# Patient Record
Sex: FEMALE | Race: BLACK OR AFRICAN AMERICAN | NOT HISPANIC OR LATINO
[De-identification: names, ages, dates, MRNs, and addresses within clinical notes are randomized per-mention and may not be internally consistent; named-entity substitution may affect disease eponyms.]

---

## 2018-03-01 PROBLEM — Z00.00 ENCOUNTER FOR PREVENTIVE HEALTH EXAMINATION: Status: ACTIVE | Noted: 2018-03-01

## 2018-03-12 ENCOUNTER — APPOINTMENT (OUTPATIENT)
Dept: NEPHROLOGY | Facility: CLINIC | Age: 68
End: 2018-03-12
Payer: MEDICARE

## 2018-03-12 VITALS — RESPIRATION RATE: 12 BRPM | SYSTOLIC BLOOD PRESSURE: 136 MMHG | DIASTOLIC BLOOD PRESSURE: 75 MMHG | HEART RATE: 85 BPM

## 2018-03-12 VITALS — WEIGHT: 216 LBS

## 2018-03-12 PROCEDURE — 99204 OFFICE O/P NEW MOD 45 MIN: CPT

## 2018-03-12 RX ORDER — GABAPENTIN 300 MG/1
300 CAPSULE ORAL 3 TIMES DAILY
Qty: 120 | Refills: 0 | Status: ACTIVE | COMMUNITY
Start: 2018-03-12

## 2018-03-12 RX ORDER — CARBAMAZEPINE 200 MG/1
200 TABLET ORAL 3 TIMES DAILY
Qty: 120 | Refills: 0 | Status: ACTIVE | COMMUNITY
Start: 2018-03-12

## 2018-03-15 ENCOUNTER — APPOINTMENT (OUTPATIENT)
Dept: VASCULAR SURGERY | Facility: CLINIC | Age: 68
End: 2018-03-15
Payer: MEDICARE

## 2018-03-15 VITALS — OXYGEN SATURATION: 100 % | HEART RATE: 57 BPM | DIASTOLIC BLOOD PRESSURE: 77 MMHG | SYSTOLIC BLOOD PRESSURE: 152 MMHG

## 2018-03-15 PROCEDURE — 93970 EXTREMITY STUDY: CPT

## 2018-03-15 PROCEDURE — 99204 OFFICE O/P NEW MOD 45 MIN: CPT

## 2018-03-16 LAB
ALBUMIN SERPL ELPH-MCNC: 3.9 G/DL
ALBUMIN SERPL ELPH-MCNC: 3.9 G/DL
ALP BLD-CCNC: 76 U/L
ALT SERPL-CCNC: 10 U/L
ANION GAP SERPL CALC-SCNC: 9 MMOL/L
APPEARANCE: CLEAR
AST SERPL-CCNC: 15 U/L
BACTERIA: NEGATIVE
BILIRUB DIRECT SERPL-MCNC: 0.1 MG/DL
BILIRUB INDIRECT SERPL-MCNC: 0.2 MG/DL
BILIRUB SERPL-MCNC: 0.3 MG/DL
BILIRUBIN URINE: NEGATIVE
BLOOD URINE: NEGATIVE
BUN SERPL-MCNC: 19 MG/DL
CALCIUM SERPL-MCNC: 9.4 MG/DL
CHLORIDE SERPL-SCNC: 88 MMOL/L
CO2 SERPL-SCNC: 28 MMOL/L
COLOR: YELLOW
CREAT SERPL-MCNC: 0.88 MG/DL
CREAT SPEC-SCNC: 56 MG/DL
CREAT SPEC-SCNC: 56 MG/DL
CREAT/PROT UR: 0.2 RATIO
GLUCOSE QUALITATIVE U: NEGATIVE MG/DL
GLUCOSE SERPL-MCNC: 94 MG/DL
KETONES URINE: NEGATIVE
LEUKOCYTE ESTERASE URINE: NEGATIVE
MICROALBUMIN 24H UR DL<=1MG/L-MCNC: 1.4 MG/DL
MICROALBUMIN/CREAT 24H UR-RTO: 25 MG/G
MICROSCOPIC-UA: NORMAL
NITRITE URINE: NEGATIVE
PH URINE: 7
PHOSPHATE SERPL-MCNC: 3.1 MG/DL
POTASSIUM SERPL-SCNC: 4.6 MMOL/L
PROT SERPL-MCNC: 7.8 G/DL
PROT UR-MCNC: 9 MG/DL
PROTEIN URINE: NEGATIVE MG/DL
RED BLOOD CELLS URINE: 1 /HPF
SODIUM SERPL-SCNC: 125 MMOL/L
SPECIFIC GRAVITY URINE: 1.01
SQUAMOUS EPITHELIAL CELLS: 0 /HPF
URATE SERPL-MCNC: 3 MG/DL
UROBILINOGEN URINE: NEGATIVE MG/DL
WHITE BLOOD CELLS URINE: 2 /HPF

## 2018-03-16 RX ORDER — CHOLECALCIFEROL (VITAMIN D3) 125 MCG
TABLET ORAL
Refills: 0 | Status: ACTIVE | COMMUNITY

## 2018-03-29 ENCOUNTER — OUTPATIENT (OUTPATIENT)
Dept: OUTPATIENT SERVICES | Facility: HOSPITAL | Age: 68
LOS: 1 days | End: 2018-03-29
Payer: MEDICARE

## 2018-03-29 ENCOUNTER — APPOINTMENT (OUTPATIENT)
Dept: CT IMAGING | Facility: HOSPITAL | Age: 68
End: 2018-03-29
Payer: MEDICARE

## 2018-03-29 PROCEDURE — 71250 CT THORAX DX C-: CPT | Mod: 26

## 2018-03-29 PROCEDURE — 71250 CT THORAX DX C-: CPT

## 2018-04-10 ENCOUNTER — APPOINTMENT (OUTPATIENT)
Dept: NEPHROLOGY | Facility: CLINIC | Age: 68
End: 2018-04-10
Payer: MEDICARE

## 2018-04-10 VITALS — SYSTOLIC BLOOD PRESSURE: 137 MMHG | DIASTOLIC BLOOD PRESSURE: 80 MMHG | RESPIRATION RATE: 12 BRPM | HEART RATE: 60 BPM

## 2018-04-10 PROCEDURE — 99214 OFFICE O/P EST MOD 30 MIN: CPT

## 2018-04-10 RX ORDER — HYDROCHLOROTHIAZIDE 25 MG/1
25 TABLET ORAL DAILY
Qty: 30 | Refills: 1 | Status: DISCONTINUED | COMMUNITY
Start: 2018-03-12 | End: 2018-04-10

## 2018-05-15 ENCOUNTER — APPOINTMENT (OUTPATIENT)
Dept: NEPHROLOGY | Facility: CLINIC | Age: 68
End: 2018-05-15
Payer: MEDICARE

## 2018-05-15 VITALS — DIASTOLIC BLOOD PRESSURE: 58 MMHG | HEART RATE: 74 BPM | RESPIRATION RATE: 14 BRPM | SYSTOLIC BLOOD PRESSURE: 124 MMHG

## 2018-05-15 PROCEDURE — 99214 OFFICE O/P EST MOD 30 MIN: CPT

## 2018-05-17 LAB
ANION GAP SERPL CALC-SCNC: 14 MMOL/L
BASOPHILS # BLD AUTO: 0.02 K/UL
BASOPHILS NFR BLD AUTO: 0.3 %
BUN SERPL-MCNC: 13 MG/DL
CALCIUM SERPL-MCNC: 9.7 MG/DL
CHLORIDE SERPL-SCNC: 92 MMOL/L
CO2 SERPL-SCNC: 24 MMOL/L
CREAT SERPL-MCNC: 0.83 MG/DL
EOSINOPHIL # BLD AUTO: 0.11 K/UL
EOSINOPHIL NFR BLD AUTO: 1.8 %
FERRITIN SERPL-MCNC: 54 NG/ML
GLUCOSE SERPL-MCNC: 93 MG/DL
HCT VFR BLD CALC: 32 %
HGB BLD-MCNC: 10.8 G/DL
IMM GRANULOCYTES NFR BLD AUTO: 0.3 %
IRON SATN MFR SERPL: 22 %
IRON SERPL-MCNC: 63 UG/DL
LYMPHOCYTES # BLD AUTO: 1.71 K/UL
LYMPHOCYTES NFR BLD AUTO: 27.5 %
MAN DIFF?: NORMAL
MCHC RBC-ENTMCNC: 28.9 PG
MCHC RBC-ENTMCNC: 33.8 GM/DL
MCV RBC AUTO: 85.6 FL
MONOCYTES # BLD AUTO: 0.99 K/UL
MONOCYTES NFR BLD AUTO: 15.9 %
NEUTROPHILS # BLD AUTO: 3.36 K/UL
NEUTROPHILS NFR BLD AUTO: 54.2 %
OSMOLALITY SERPL: 274 MOS/KG
OSMOLALITY UR: 539 MOS/KG
PLATELET # BLD AUTO: 246 K/UL
POTASSIUM SERPL-SCNC: 4.7 MMOL/L
RBC # BLD: 3.74 M/UL
RBC # FLD: 14.1 %
SODIUM ?TM SUB UR QN: 100 MMOL/L
SODIUM SERPL-SCNC: 130 MMOL/L
TIBC SERPL-MCNC: 281 UG/DL
UIBC SERPL-MCNC: 218 UG/DL
URATE SERPL-MCNC: 2.8 MG/DL
URATE UR-MCNC: 36.3 MG/DL
WBC # FLD AUTO: 6.21 K/UL

## 2018-05-29 ENCOUNTER — TRANSCRIPTION ENCOUNTER (OUTPATIENT)
Age: 68
End: 2018-05-29

## 2018-08-21 ENCOUNTER — APPOINTMENT (OUTPATIENT)
Dept: OTOLARYNGOLOGY | Facility: CLINIC | Age: 68
End: 2018-08-21
Payer: MEDICARE

## 2018-08-21 VITALS
HEIGHT: 69 IN | HEART RATE: 74 BPM | WEIGHT: 215 LBS | DIASTOLIC BLOOD PRESSURE: 80 MMHG | BODY MASS INDEX: 31.84 KG/M2 | SYSTOLIC BLOOD PRESSURE: 160 MMHG

## 2018-08-21 DIAGNOSIS — E04.9 NONTOXIC GOITER, UNSPECIFIED: ICD-10-CM

## 2018-08-21 DIAGNOSIS — Z86.79 PERSONAL HISTORY OF OTHER DISEASES OF THE CIRCULATORY SYSTEM: ICD-10-CM

## 2018-08-21 DIAGNOSIS — Z82.49 FAMILY HISTORY OF ISCHEMIC HEART DISEASE AND OTHER DISEASES OF THE CIRCULATORY SYSTEM: ICD-10-CM

## 2018-08-21 DIAGNOSIS — J34.2 DEVIATED NASAL SEPTUM: ICD-10-CM

## 2018-08-21 PROCEDURE — 31575 DIAGNOSTIC LARYNGOSCOPY: CPT

## 2018-08-21 PROCEDURE — 99204 OFFICE O/P NEW MOD 45 MIN: CPT | Mod: 25

## 2018-08-21 RX ORDER — LORATADINE 5 MG/5 ML
10 SOLUTION, ORAL ORAL
Qty: 30 | Refills: 2 | Status: ACTIVE | COMMUNITY
Start: 2018-08-21 | End: 1900-01-01

## 2018-08-21 RX ORDER — FLUTICASONE PROPIONATE 50 UG/1
50 SPRAY, METERED NASAL DAILY
Qty: 1 | Refills: 5 | Status: ACTIVE | COMMUNITY
Start: 2018-08-21 | End: 1900-01-01

## 2018-08-23 PROBLEM — Z82.49 FAMILY HISTORY OF HYPERTENSION: Status: ACTIVE | Noted: 2018-08-21

## 2018-08-23 PROBLEM — Z86.79 HISTORY OF HYPERTENSION: Status: RESOLVED | Noted: 2018-08-21 | Resolved: 2018-08-23

## 2018-08-23 PROBLEM — J34.2 DEVIATED NASAL SEPTUM: Status: ACTIVE | Noted: 2018-08-23

## 2018-08-23 PROBLEM — E04.9 GOITER: Status: ACTIVE | Noted: 2018-08-23

## 2018-08-23 RX ORDER — ATORVASTATIN CALCIUM 10 MG/1
10 TABLET, FILM COATED ORAL
Refills: 0 | Status: ACTIVE | COMMUNITY

## 2018-08-23 RX ORDER — TIMOLOL MALEATE 10 MG/1
10 TABLET ORAL
Refills: 0 | Status: ACTIVE | COMMUNITY

## 2018-10-23 ENCOUNTER — APPOINTMENT (OUTPATIENT)
Dept: OTOLARYNGOLOGY | Facility: CLINIC | Age: 68
End: 2018-10-23

## 2019-02-22 ENCOUNTER — APPOINTMENT (OUTPATIENT)
Dept: OTOLARYNGOLOGY | Facility: CLINIC | Age: 69
End: 2019-02-22
Payer: MEDICARE

## 2019-02-22 VITALS
DIASTOLIC BLOOD PRESSURE: 82 MMHG | HEART RATE: 61 BPM | BODY MASS INDEX: 31.1 KG/M2 | HEIGHT: 69 IN | WEIGHT: 210 LBS | SYSTOLIC BLOOD PRESSURE: 159 MMHG

## 2019-02-22 PROCEDURE — 99214 OFFICE O/P EST MOD 30 MIN: CPT

## 2019-02-22 NOTE — PROCEDURE
[FreeTextEntry3] : Anterior Rhinoscopy:\par leftward septal deviation w sharp spur\par mild sinonasal mucosal edema and erythema with clear mucus\par \par Fiberoptic Laryngoscopy (from prior visit):\par upper airway widely patent\par moderately severe boggy postcricoid edema\par TVF symmetric and mobile\par no masses/lesions

## 2019-02-22 NOTE — DATA REVIEWED
[de-identified] : CT chest without contrast 3/2018:\par FINDINGS: \par The heart is normal in size. Coronary artery calcifications. Probable trace \par aortic valve calcifications. No pericardial effusion is seen. \par \par Evaluation of the vasculature is limited without intravenous contrast, but \par there is no evidence of thoracic aortic aneurysm. \par \par Evaluation for adenopathy is limited without intravenous contrast. Within \par that limitation, no noncalcified mediastinal or hilar lymphadenopathy is \par seen. Calcified left hilar lymph node. Partially imaged thyroid gland \par showing diffuse enlargement of both lobes and isthmus. Small calcified \par nodules in the right thyroid lobe. Mild dilatation of the esophagus \par containing air/fluid level of uncertain etiology. \par \par No pleural effusions are seen. Evaluation of the pulmonary parenchyma \par demonstrates a 4 mm right middle lobe pulmonary nodule abutting the minor \par fissure on image 151 series 4. There is linear atelectasis in the left lower \par lobe. Mild bilateral cylindrical bronchiectasis especially the lower lobes, \par lingula and right middle lobe. Calcified nodule in the right lower lobe and \par left upper lobe compatible with old granulomatous disease. \par \par Limited evaluation of the upper abdomen demonstrates no significant \par abnormality. \par \par Evaluation of the osseous structures demonstrates severe degenerative \par changes. Thoracolumbar scoliosis. \par \par IMPRESSION: \par 4 mm right middle lobe pulmonary nodule. Patient is at high risk for \par malignancy/smoker, a follow-up CT of the chest in 12 months may be obtained. \par \par Evidence of old granulomatous disease. \par \par Mild dilatation of the esophagus with air-fluid levels. In etiology.

## 2019-02-22 NOTE — PHYSICAL EXAM
[Midline] : trachea located in midline position [Normal] : no rashes [FreeTextEntry1] : voice strong [de-identified] : flat and soft, no masses/lesions

## 2019-02-22 NOTE — ASSESSMENT
[FreeTextEntry1] : 68F here in followup. Since last seen 6 months prior, she is doing well. She took the PPIs daily for one month and has continued using the nasal steroid sprays since last visit. Her coughing/throat clearing had completely stopped until several weeks ago when it restarted, now just mild and intermittent, and only at night. She is improved and content. She did not get the thyroid FNA as recommended last visit.\par Initially seen for persistent dry, hacking cough for the past year. The cough is accompanied with frequent throat clearing and postnasal drip. Coughing is intermittent without any known triggers, unaffected by time, eating or body position. There is no difficulty eating, breathing, swallowing or coughing. There is no voice change. The coughing does not wake her up at night. She takes daily allegra for seasonal allergies. She has frequent late night eating, has coffee, tea and chocolate. Noncontrast CT chest 3/2018 shows a partially imaged thyroid gland with diffuse enlargement and a calcified right nodule, but no tracheal compression. On exam, voice is strong and neck is flat. Rhinoscopy shows leftward septal deviation and diffuse sinonasal mucosal edema and erythema with clear mucus. The rest of the complete and comprehensive head and neck exam is otherwise unremarkable.\par Her sx were resolved on reflux medications with diet/lifestyle changes and nasal steroid sprays. Will restart daily PPI for 1 month, then start to taper over an additional two month period. She should continue the flonase and allegra for rhinitis. Will also proceed with US-guided FNA of calcified right thyroid nodules. RTO after FNA. All questions answered.

## 2019-02-22 NOTE — HISTORY OF PRESENT ILLNESS
[de-identified] : 68F here in followup.\par \par Since last seen 6 months prior, she is doing well. She took the PPIs daily for one month and has continued using the nasal steroid sprays since last visit. Her coughing had completely stopped until several weeks ago when it restarted, now just mild and intermittent, and only at night. She is improved and content.\par She did not get the thyroid FNA as recommended last visit.\par \par Initially seen for persistent dry, hacking cough. Coughing is intermittent without any known triggers, unaffected by time, eating or body position. There is no difficulty eating, breathing, swallowing or coughing. There is no voice change. It does not wake her up at night.\par The cough is accompanied with frequent throat clearing and postnasal drip.  \par She takes daily allegra for seasonal allergies.\par Frequent late night eating, has coffee, tea and chocolate.\par \par No tobacco/etoh\par \par CT chest 3/2018 shows partially imaged thyroid gland with diffuse enlargement w calcified right nodule, but no tracheal compression.\par \par ROS otherwise unremarkable.

## 2019-03-28 ENCOUNTER — APPOINTMENT (OUTPATIENT)
Dept: ULTRASOUND IMAGING | Facility: HOSPITAL | Age: 69
End: 2019-03-28
Payer: MEDICARE

## 2019-03-28 ENCOUNTER — OUTPATIENT (OUTPATIENT)
Dept: OUTPATIENT SERVICES | Facility: HOSPITAL | Age: 69
LOS: 1 days | End: 2019-03-28
Payer: MEDICARE

## 2019-03-28 ENCOUNTER — RESULT REVIEW (OUTPATIENT)
Age: 69
End: 2019-03-28

## 2019-03-28 PROCEDURE — 10005 FNA BX W/US GDN 1ST LES: CPT

## 2019-03-28 PROCEDURE — 88173 CYTOPATH EVAL FNA REPORT: CPT

## 2019-03-28 PROCEDURE — 88305 TISSUE EXAM BY PATHOLOGIST: CPT

## 2019-03-29 LAB — NON-GYNECOLOGICAL CYTOLOGY STUDY: SIGNIFICANT CHANGE UP

## 2019-04-01 ENCOUNTER — TRANSCRIPTION ENCOUNTER (OUTPATIENT)
Age: 69
End: 2019-04-01

## 2019-04-05 ENCOUNTER — APPOINTMENT (OUTPATIENT)
Dept: NEPHROLOGY | Facility: CLINIC | Age: 69
End: 2019-04-05
Payer: MEDICARE

## 2019-04-05 VITALS — DIASTOLIC BLOOD PRESSURE: 77 MMHG | HEART RATE: 65 BPM | SYSTOLIC BLOOD PRESSURE: 144 MMHG | RESPIRATION RATE: 16 BRPM

## 2019-04-05 PROCEDURE — 99215 OFFICE O/P EST HI 40 MIN: CPT

## 2019-04-05 RX ORDER — FOLIC ACID 20 MG
CAPSULE ORAL
Refills: 0 | Status: DISCONTINUED | COMMUNITY
End: 2019-04-05

## 2019-04-05 RX ORDER — AMLODIPINE BESYLATE AND BENAZEPRIL HYDROCHLORIDE 10; 40 MG/1; MG/1
10-40 CAPSULE ORAL
Refills: 0 | Status: DISCONTINUED | COMMUNITY
End: 2019-04-05

## 2019-04-05 RX ORDER — LOSARTAN POTASSIUM 100 MG/1
100 TABLET, FILM COATED ORAL DAILY
Qty: 90 | Refills: 3 | Status: ACTIVE | COMMUNITY
Start: 2019-04-05

## 2019-04-05 RX ORDER — VALSARTAN 320 MG/1
320 TABLET, COATED ORAL
Qty: 30 | Refills: 0 | Status: DISCONTINUED | COMMUNITY
Start: 2018-03-12 | End: 2019-04-05

## 2019-04-11 NOTE — HISTORY OF PRESENT ILLNESS
[FreeTextEntry1] : 67yo obese black F with chronic hyponatremia 2/2 carbamazepine and/or HCTZ, chronic HTN and LE edema here for f/u:\par \par Feeling well, no major health issues since last visit. She did trip over sipped and hit her shoulder, was in a lot of pain but this has resolved. Also with recurrent carpal tunnel. \par She went back on the HCTZ in January because of her LE edema. \par \par No dizziness/nausea. \par Edema persists, wearing compression stockings which helps. \par \par Checks BP at home, says it's always low at home and high in doctor's offices. 130/80s at home. \par \par Cardiologist Dr. Panfilo Forrester\par PCP Dr. Jamila Finney\par \par All other ROS negative

## 2019-04-11 NOTE — ASSESSMENT
[FreeTextEntry1] : 66yo F with chronic hyponatremia ?2/2 HCTZ or carbamazapine, chronic HTN and LE edema here for f/u:\par needs update\par -added torsemide instead of hctz\par - echo, cmp, BNP, serum and urine Osm and uric acids, urine Na, TSH. If torsemide doesn't help w edema on next visit, and echo wnl, will d/c amlodipine and try carvedilol. Cont Losartan. \par -element of white coat HTN - to bring home BP monitor (wrist) to office next visit so we can compare it to ours.\par  \par # Chronic asymp. 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 If Na drops again can start NaCl tabs 1g BID however this will likely exacerbate her edema.\par \par # KAMARI - resolved, Cr 0.8\par \par # LE edema\par - cardiac w/u per pcp\par - d/w pt and pcp that could certainly be from amlodipine, if cardiac w/u negative suggest decreasing amlodipine to 5mg to see if improves or switching all together to different agent\par \par # HTN\par - well controlled of HCTZ, on Valsartan and amlodipine 10mg. Low na diet and weight loss/exercise discussed w pt

## 2019-04-11 NOTE — DISCUSSION/SUMMARY
[FreeTextEntry1] : 66yo F with chronic hyponatremia ?2/2 HCTZ or carbamazapine, chronic HTN and LE edema here for f/u:\par \par call Dr ravi when labs in, d/c amlo plan in exchange for lasix and coreg\par \par 2012 134, 2013 129, 2014 128, 2016 136, 2017 Oct 125. Low Cl. No w/u for Na that he can see in the past, presumed to be 2/2 carbamazepine +/- HCTZ, will need to be stopped\par Advised echo for eval of LE swelling as no renal etiology and duplex LE negative as well by vascular. He will switch her from amlodipine which may also be contributing.

## 2019-04-11 NOTE — PHYSICAL EXAM
[General Appearance - Alert] : alert [General Appearance - In No Acute Distress] : in no acute distress [Sclera] : the sclera and conjunctiva were normal [PERRL With Normal Accommodation] : pupils were equal in size, round, and reactive to light [Extraocular Movements] : extraocular movements were intact [Outer Ear] : the ears and nose were normal in appearance [Oropharynx] : the oropharynx was normal [Neck Appearance] : the appearance of the neck was normal [Neck Cervical Mass (___cm)] : no neck mass was observed [Jugular Venous Distention Increased] : there was no jugular-venous distention [Thyroid Diffuse Enlargement] : the thyroid was not enlarged [Thyroid Nodule] : there were no palpable thyroid nodules [Auscultation Breath Sounds / Voice Sounds] : lungs were clear to auscultation bilaterally [Heart Rate And Rhythm] : heart rate was normal and rhythm regular [Heart Sounds] : normal S1 and S2 [Heart Sounds Gallop] : no gallops [Murmurs] : no murmurs [Heart Sounds Pericardial Friction Rub] : no pericardial rub [Bowel Sounds] : normal bowel sounds [Abdomen Soft] : soft [Abdomen Tenderness] : non-tender [Abdomen Mass (___ Cm)] : no abdominal mass palpated [Abnormal Walk] : normal gait [Musculoskeletal - Swelling] : no joint swelling seen [Skin Color & Pigmentation] : normal skin color and pigmentation [Skin Turgor] : normal skin turgor [] : no rash [Cranial Nerves] : cranial nerves 2-12 were intact [No Focal Deficits] : no focal deficits [Oriented To Time, Place, And Person] : oriented to person, place, and time [Impaired Insight] : insight and judgment were intact [Affect] : the affect was normal [FreeTextEntry1] : + edema b/l

## 2019-07-09 ENCOUNTER — APPOINTMENT (OUTPATIENT)
Dept: NEPHROLOGY | Facility: CLINIC | Age: 69
End: 2019-07-09
Payer: MEDICARE

## 2019-07-09 VITALS — RESPIRATION RATE: 16 BRPM | SYSTOLIC BLOOD PRESSURE: 140 MMHG | HEART RATE: 68 BPM | DIASTOLIC BLOOD PRESSURE: 72 MMHG

## 2019-07-09 DIAGNOSIS — R05 COUGH: ICD-10-CM

## 2019-07-09 DIAGNOSIS — N17.9 ACUTE KIDNEY FAILURE, UNSPECIFIED: ICD-10-CM

## 2019-07-09 PROCEDURE — 99215 OFFICE O/P EST HI 40 MIN: CPT

## 2019-07-09 RX ORDER — OMEPRAZOLE 40 MG/1
40 CAPSULE, DELAYED RELEASE ORAL
Qty: 30 | Refills: 3 | Status: DISCONTINUED | COMMUNITY
Start: 2018-08-21 | End: 2019-07-09

## 2019-07-09 RX ORDER — ESOMEPRAZOLE MAGNESIUM 40 MG/1
40 CAPSULE, DELAYED RELEASE ORAL DAILY
Qty: 30 | Refills: 5 | Status: DISCONTINUED | COMMUNITY
Start: 2019-02-22 | End: 2019-07-09

## 2019-07-15 PROBLEM — N17.9 AKI (ACUTE KIDNEY INJURY): Status: ACTIVE | Noted: 2018-03-12

## 2019-07-15 PROBLEM — R05 CHRONIC COUGHING: Status: ACTIVE | Noted: 2018-08-23

## 2019-07-15 NOTE — PHYSICAL EXAM
[General Appearance - Alert] : alert [General Appearance - In No Acute Distress] : in no acute distress [Sclera] : the sclera and conjunctiva were normal [PERRL With Normal Accommodation] : pupils were equal in size, round, and reactive to light [Extraocular Movements] : extraocular movements were intact [Outer Ear] : the ears and nose were normal in appearance [Oropharynx] : the oropharynx was normal [Neck Appearance] : the appearance of the neck was normal [Neck Cervical Mass (___cm)] : no neck mass was observed [Jugular Venous Distention Increased] : there was no jugular-venous distention [Thyroid Diffuse Enlargement] : the thyroid was not enlarged [Thyroid Nodule] : there were no palpable thyroid nodules [Auscultation Breath Sounds / Voice Sounds] : lungs were clear to auscultation bilaterally [Heart Sounds] : normal S1 and S2 [Heart Rate And Rhythm] : heart rate was normal and rhythm regular [Heart Sounds Gallop] : no gallops [Heart Sounds Pericardial Friction Rub] : no pericardial rub [Murmurs] : no murmurs [Bowel Sounds] : normal bowel sounds [Abdomen Soft] : soft [Abdomen Tenderness] : non-tender [Abnormal Walk] : normal gait [Musculoskeletal - Swelling] : no joint swelling seen [Abdomen Mass (___ Cm)] : no abdominal mass palpated [Skin Turgor] : normal skin turgor [Skin Color & Pigmentation] : normal skin color and pigmentation [] : no rash [Cranial Nerves] : cranial nerves 2-12 were intact [No Focal Deficits] : no focal deficits [Oriented To Time, Place, And Person] : oriented to person, place, and time [Impaired Insight] : insight and judgment were intact [Affect] : the affect was normal [FreeTextEntry1] : + edema b/l

## 2019-07-15 NOTE — ASSESSMENT
[FreeTextEntry1] : 66yo F with chronic hyponatremia ?2/2 HCTZ or carbamazapine, chronic HTN and LE edema here for f/u:\par \par -added torsemide instead of hctz\par - echo, cmp, BNP, serum and urine Osm and uric acids, urine Na, TSH. If torsemide doesn't help w edema on next visit, and echo wnl, will d/c amlodipine and try carvedilol. Cont Losartan. \par -element of white coat HTN - to bring home BP monitor (wrist) to office next visit so we can compare it to ours.\par  \par # Chronic asymp. 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 If Na drops again can start NaCl tabs 1g BID however this will likely exacerbate her edema. She forgot to do April labs that we'd ordered - to do today\par \par # KAMARI - resolved, Cr 0.8 - recheck\par \par # LE edema - likely venous insufficiency\par - still recommend reducing amlodipine to 5mg \par \par # HTN\par - fair control\par \par # coughing - d/w pt that could be 2/2 GERD, to f/u with PCP

## 2019-07-15 NOTE — HISTORY OF PRESENT ILLNESS
[FreeTextEntry1] : 67yo obese black F with chronic hyponatremia 2/2 carbamazepine and/or HCTZ, chronic HTN and LE edema here for f/u:\par \par Urinating frequently since torsemide started in April. No nocturia. No orthostatic sx.  No excessive thirsty. Hasn't been checking BP at home. Has noted improvement in LE especially when she uses compression stockings. \par \par Cardiologist Dr. Panfilo Forrester\par PCP Dr. Jamila Finney\par \par \par OTC Baby ASA 81mg, no herbals/ supplements\par \par \par All other ROS negative

## 2019-08-06 ENCOUNTER — FORM ENCOUNTER (OUTPATIENT)
Age: 69
End: 2019-08-06

## 2019-08-07 ENCOUNTER — OUTPATIENT (OUTPATIENT)
Dept: OUTPATIENT SERVICES | Facility: HOSPITAL | Age: 69
LOS: 1 days | End: 2019-08-07
Payer: MEDICARE

## 2019-08-07 DIAGNOSIS — E87.1 HYPO-OSMOLALITY AND HYPONATREMIA: ICD-10-CM

## 2019-08-07 PROCEDURE — 93306 TTE W/DOPPLER COMPLETE: CPT | Mod: 26

## 2019-08-07 PROCEDURE — 93306 TTE W/DOPPLER COMPLETE: CPT

## 2019-08-09 ENCOUNTER — RX RENEWAL (OUTPATIENT)
Age: 69
End: 2019-08-09

## 2019-08-29 ENCOUNTER — RX RENEWAL (OUTPATIENT)
Age: 69
End: 2019-08-29

## 2019-11-12 ENCOUNTER — APPOINTMENT (OUTPATIENT)
Dept: NEPHROLOGY | Facility: CLINIC | Age: 69
End: 2019-11-12
Payer: MEDICARE

## 2019-11-12 VITALS — SYSTOLIC BLOOD PRESSURE: 142 MMHG | DIASTOLIC BLOOD PRESSURE: 65 MMHG | RESPIRATION RATE: 18 BRPM | HEART RATE: 64 BPM

## 2019-11-12 PROCEDURE — 99215 OFFICE O/P EST HI 40 MIN: CPT

## 2019-11-12 RX ORDER — TORSEMIDE 10 MG/1
10 TABLET ORAL DAILY
Qty: 90 | Refills: 3 | Status: ACTIVE | COMMUNITY
Start: 2019-04-05 | End: 1900-01-01

## 2019-11-12 NOTE — ASSESSMENT
[FreeTextEntry1] : 69yo obese black F with chronic hyponatremia 2/2 carbamazepine and/or HCTZ, chronic HTN and LE edema here for f/u:\par \par # Chronic asymp. 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 Asked her to f/u with neuro for recommendations on possible alternative agents yaneth as her freq. falls may be related to hyponatremia.\par \par # LE edema - likely venous insufficiency\par -  recommend reducing amlodipine to 5mg and will start spironolactone 25mg daily in it's place, recheck BMP for K in 4 weeks. Thiazides worsen her hyponatremia. If BP well controlled <140/90 this month at home recommend stopping amlodipine all together. Can uptitrate spironolactone as needed for goal bp or if HR allows start coreg.\par - echo showed some low grade diastolic dysfunction and mild LVH but no obvious cause of LE edema, kidneys/liver wnl as well. \par - recommended vascular surgeon consultation, cont support stockings\par \par # HTN\par - fair control, reinforced low Na diet. Suspect element of white coat HTN - asked her to start checking her BP BID 2-3x/week and logging. \par \par RTC in 3 months for f/u\par

## 2019-11-12 NOTE — REVIEW OF SYSTEMS
[Lower Ext Edema] : lower extremity edema [Negative] : Endocrine [SOB on Exertion] : shortness of breath during exertion [Recent Weight Gain (___ Lbs)] : recent [unfilled] ~Ulb weight gain [Orthopnea] : no orthopnea [As Noted in HPI] : as noted in HPI

## 2019-11-12 NOTE — HISTORY OF PRESENT ILLNESS
[FreeTextEntry1] : 69yo obese black F with chronic hyponatremia 2/2 carbamazepine and/or HCTZ, chronic HTN and LE edema here for f/u:\par \par Cardiologist Dr. Panfilo Forrester\par PCP Dr. Oralia Finney\par Neurologist: Dr. Ivan Francisco\par \par Has been feeling well however her main concern is frequent falls over the last few months, describes them as mechanical. Hasn't followed up with neurologist. Hasn't had blood work since August. Not drinking excess water. \par LE edema has improved on torsemide and compression stockings however b/l foot swelling persists. \par Occasionally gets dyspneic, notes can't finish sentences sometimes, going on for a few months. Can sing in choir without any dyspnea. Sees PCP on Fri. No orthopnea/PND. \par Gaining weight. \par No confusion/mental cloudiness. R  knee has always been weaker than L, she thinks it's because of the falls. \par doesn't check BP at home\par \par All other ROS negative

## 2019-11-12 NOTE — PHYSICAL EXAM
[General Appearance - Alert] : alert [Sclera] : the sclera and conjunctiva were normal [PERRL With Normal Accommodation] : pupils were equal in size, round, and reactive to light [General Appearance - In No Acute Distress] : in no acute distress [Extraocular Movements] : extraocular movements were intact [Outer Ear] : the ears and nose were normal in appearance [Oropharynx] : the oropharynx was normal [Neck Appearance] : the appearance of the neck was normal [Neck Cervical Mass (___cm)] : no neck mass was observed [Thyroid Diffuse Enlargement] : the thyroid was not enlarged [Jugular Venous Distention Increased] : there was no jugular-venous distention [Auscultation Breath Sounds / Voice Sounds] : lungs were clear to auscultation bilaterally [Thyroid Nodule] : there were no palpable thyroid nodules [Heart Sounds] : normal S1 and S2 [Heart Sounds Gallop] : no gallops [Heart Rate And Rhythm] : heart rate was normal and rhythm regular [Heart Sounds Pericardial Friction Rub] : no pericardial rub [Murmurs] : no murmurs [Bowel Sounds] : normal bowel sounds [Abdomen Tenderness] : non-tender [Abdomen Soft] : soft [Abdomen Mass (___ Cm)] : no abdominal mass palpated [Abnormal Walk] : normal gait [Skin Color & Pigmentation] : normal skin color and pigmentation [Skin Turgor] : normal skin turgor [Musculoskeletal - Swelling] : no joint swelling seen [No Focal Deficits] : no focal deficits [Cranial Nerves] : cranial nerves 2-12 were intact [] : no rash [Oriented To Time, Place, And Person] : oriented to person, place, and time [Impaired Insight] : insight and judgment were intact [Affect] : the affect was normal [FreeTextEntry1] : + 2 edema b/l

## 2020-07-15 ENCOUNTER — APPOINTMENT (OUTPATIENT)
Dept: NEPHROLOGY | Facility: CLINIC | Age: 70
End: 2020-07-15
Payer: MEDICARE

## 2020-07-15 DIAGNOSIS — R60.0 LOCALIZED EDEMA: ICD-10-CM

## 2020-07-15 DIAGNOSIS — I10 ESSENTIAL (PRIMARY) HYPERTENSION: ICD-10-CM

## 2020-07-15 DIAGNOSIS — G50.0 TRIGEMINAL NEURALGIA: ICD-10-CM

## 2020-07-15 DIAGNOSIS — E87.1 HYPO-OSMOLALITY AND HYPONATREMIA: ICD-10-CM

## 2020-07-15 PROCEDURE — 99214 OFFICE O/P EST MOD 30 MIN: CPT | Mod: 95

## 2020-07-15 RX ORDER — AMLODIPINE BESYLATE 5 MG/1
5 TABLET ORAL DAILY
Qty: 90 | Refills: 3 | Status: ACTIVE | COMMUNITY
Start: 2018-03-12

## 2020-07-15 NOTE — DISCUSSION/SUMMARY
[FreeTextEntry1] : 68yo F with chronic hyponatremia ?2/2 HCTZ or carbamazapine, chronic HTN and LE edema here for f/u:\par \par call Dr ravi when labs in, d/c amlo plan in exchange for lasix and coreg\par \par 2012 134, 2013 129, 2014 128, 2016 136, 2017 Oct 125. Low Cl. No w/u for Na that he can see in the past, presumed to be 2/2 carbamazepine +/- HCTZ, will need to be stopped\par Advised echo for eval of LE swelling as no renal etiology and duplex LE negative as well by vascular. He will switch her from amlodipine which may also be contributing.

## 2020-07-15 NOTE — ASSESSMENT
[FreeTextEntry1] : 70yo obese black F with chronic hyponatremia 2/2 carbamazepine and/or HCTZ, chronic HTN and LE edema here for f/u via video visit:\par \par # Chronic asymp. 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 \par - reviewed July labs, Na 136\par - Cr elevated slightly from 0.8/0.9 to 1.06 and egfr 60 from 70-80s - may be related to Losartan/Spironolactone which is acceptable as long as repeat BMP in 6 months is stable. may also be slightly elevated because of reduced renal perfusion in the setting of lower BPs, stopped amlodipine and asked her to check her BPs BID for 5 days in a row and start 2.5mg back if sbp often 140s\par \par # LE edema - likely venous insufficiency - well controlled\par - echo showed some low grade diastolic dysfunction and mild LVH but no obvious cause of LE edema, kidneys/liver wnl as well. \par - recommended vascular surgeon consultation, cont support stockings\par \par RTC in 6 months for f/u\par \par Discussed with patient: You have chosen to receive care through the use of tele-media. It enables health care providers at different locations to provide safe, effective, and convenient care through the use of technology. Please note this is a billable encounter. As with any health care service, there are risks associated with the use of tele-media, including  issues. You understand that I cannot physically examine you and that you may need to come to the office to complete the assessment. \par The patient agreed verbally and understands the risks and benefits of tele-media as explained. All questions regarding tele-media encounters were answered.\par

## 2020-07-15 NOTE — HISTORY OF PRESENT ILLNESS
[Medical Office: ([facility])___] : at the medical office located in  [Home] : at home, [unfilled] , at the time of the visit. [Verbal consent obtained from patient] : the patient, [unfilled] [FreeTextEntry1] : 68yo obese black F with chronic hyponatremia 2/2 carbamazepine and/or HCTZ, chronic HTN and LE edema here for f/u via video visit:\par \par Cardiologist Dr. Panfilo Forrester\par PCP Dr. Oralia Finney-> now new PCP Dr. Gianfranco Gonzalez \par Neurologist: Dr. Ivan Francisco\par \par Hasn't seen any doctors since Feb, generally feels well. \par Gained weight since Covid, keeping busy doing things in her house with repairs/upkeep. She's going to start walking for exercise. \par BP has been on the lower side, last month went down to 102 sys. Hasn't checked her BP since. Asymp. Often <110 sys, never higher than 130sys\par \par b/l knee pain and has fallen a few times because of the knee pain or knees buckling under her. Denies any weakness, numbness/tingling, does have some lower back pain. Has an yovani with neurology upcoming (missed her neuro yovani earlier 2/2 covid). Doesn't use OTC pain medication\par \par Trigeminal neuralgia sx are controlled w carbamazepine. \par \par LE edema has been well controlled w use of compression stockings.\par \par All other ROS negative

## 2020-07-15 NOTE — REVIEW OF SYSTEMS
[Recent Weight Gain (___ Lbs)] : recent [unfilled] ~Ulb weight gain [SOB on Exertion] : shortness of breath during exertion [Lower Ext Edema] : lower extremity edema [As Noted in HPI] : as noted in HPI [Negative] : Heme/Lymph [Orthopnea] : no orthopnea

## 2020-07-15 NOTE — PHYSICAL EXAM
[General Appearance - Alert] : alert [General Appearance - In No Acute Distress] : in no acute distress [Thyroid Diffuse Enlargement] : the thyroid was not enlarged [Thyroid Nodule] : there were no palpable thyroid nodules [Abdomen Mass (___ Cm)] : no abdominal mass palpated [Oriented To Time, Place, And Person] : oriented to person, place, and time [Impaired Insight] : insight and judgment were intact [Affect] : the affect was normal

## 2020-12-07 ENCOUNTER — RX RENEWAL (OUTPATIENT)
Age: 70
End: 2020-12-07

## 2020-12-07 RX ORDER — SPIRONOLACTONE 25 MG/1
25 TABLET ORAL
Qty: 90 | Refills: 0 | Status: ACTIVE | COMMUNITY
Start: 2019-11-12 | End: 1900-01-01

## 2021-01-20 ENCOUNTER — APPOINTMENT (OUTPATIENT)
Dept: NEPHROLOGY | Facility: CLINIC | Age: 71
End: 2021-01-20

## 2021-01-26 ENCOUNTER — NON-APPOINTMENT (OUTPATIENT)
Age: 71
End: 2021-01-26

## 2022-02-23 DIAGNOSIS — I87.2 VENOUS INSUFFICIENCY (CHRONIC) (PERIPHERAL): ICD-10-CM

## 2022-04-18 PROBLEM — I87.2 CHRONIC VENOUS INSUFFICIENCY: Status: ACTIVE | Noted: 2022-04-18

## 2023-01-01 NOTE — CONSULT LETTER
[Dear  ___] : Dear  [unfilled], [Courtesy Letter:] : I had the pleasure of seeing your patient, [unfilled], in my office today. [Consult Closing:] : Thank you very much for allowing me to participate in the care of this patient.  If you have any questions, please do not hesitate to contact me. [Sincerely,] : Sincerely, [FreeTextEntry3] : Dawood Ponce MD\par Department of Otolaryngology - Head and Neck Surgery\par [facility] Statement Selected